# Patient Record
Sex: FEMALE | URBAN - METROPOLITAN AREA
[De-identification: names, ages, dates, MRNs, and addresses within clinical notes are randomized per-mention and may not be internally consistent; named-entity substitution may affect disease eponyms.]

---

## 2021-01-27 ENCOUNTER — APPOINTMENT (RX ONLY)
Dept: URBAN - METROPOLITAN AREA CLINIC 22 | Facility: CLINIC | Age: 41
Setting detail: DERMATOLOGY
End: 2021-01-27

## 2021-01-27 DIAGNOSIS — Z71.89 OTHER SPECIFIED COUNSELING: ICD-10-CM

## 2021-01-27 DIAGNOSIS — D22 MELANOCYTIC NEVI: ICD-10-CM

## 2021-01-27 DIAGNOSIS — D18.0 HEMANGIOMA: ICD-10-CM

## 2021-01-27 DIAGNOSIS — L81.4 OTHER MELANIN HYPERPIGMENTATION: ICD-10-CM

## 2021-01-27 DIAGNOSIS — L82.1 OTHER SEBORRHEIC KERATOSIS: ICD-10-CM

## 2021-01-27 PROBLEM — D18.01 HEMANGIOMA OF SKIN AND SUBCUTANEOUS TISSUE: Status: ACTIVE | Noted: 2021-01-27

## 2021-01-27 PROBLEM — D48.5 NEOPLASM OF UNCERTAIN BEHAVIOR OF SKIN: Status: ACTIVE | Noted: 2021-01-27

## 2021-01-27 PROBLEM — D22.5 MELANOCYTIC NEVI OF TRUNK: Status: ACTIVE | Noted: 2021-01-27

## 2021-01-27 PROCEDURE — ? BIOPSY BY SHAVE METHOD

## 2021-01-27 PROCEDURE — 11102 TANGNTL BX SKIN SINGLE LES: CPT

## 2021-01-27 PROCEDURE — ? COUNSELING

## 2021-01-27 PROCEDURE — ? ADDITIONAL NOTES

## 2021-01-27 PROCEDURE — 99203 OFFICE O/P NEW LOW 30 MIN: CPT | Mod: 25

## 2021-01-27 ASSESSMENT — LOCATION SIMPLE DESCRIPTION DERM
LOCATION SIMPLE: LEFT FOREARM
LOCATION SIMPLE: RIGHT UPPER BACK
LOCATION SIMPLE: LEFT UPPER BACK
LOCATION SIMPLE: ABDOMEN

## 2021-01-27 ASSESSMENT — LOCATION DETAILED DESCRIPTION DERM
LOCATION DETAILED: LEFT INFERIOR UPPER BACK
LOCATION DETAILED: LEFT LATERAL ABDOMEN
LOCATION DETAILED: LEFT SUPERIOR MEDIAL UPPER BACK
LOCATION DETAILED: LEFT PROXIMAL DORSAL FOREARM
LOCATION DETAILED: RIGHT MID-UPPER BACK

## 2021-01-27 ASSESSMENT — LOCATION ZONE DERM
LOCATION ZONE: ARM
LOCATION ZONE: TRUNK

## 2021-01-27 NOTE — PROCEDURE: ADDITIONAL NOTES
Render Risk Assessment In Note?: no
Additional Notes: Pt notes for last few months it has been consistently itching/irritating and additionally feels it has lightened considerably during this time.
Detail Level: Detailed

## 2021-01-27 NOTE — PROCEDURE: BIOPSY BY SHAVE METHOD

## 2024-04-12 ENCOUNTER — APPOINTMENT (OUTPATIENT)
Dept: RADIOLOGY | Facility: MEDICAL CENTER | Age: 44
End: 2024-04-12
Attending: EMERGENCY MEDICINE

## 2024-04-12 ENCOUNTER — HOSPITAL ENCOUNTER (EMERGENCY)
Facility: MEDICAL CENTER | Age: 44
End: 2024-04-12
Attending: EMERGENCY MEDICINE

## 2024-04-12 VITALS
OXYGEN SATURATION: 93 % | WEIGHT: 253.97 LBS | HEIGHT: 55 IN | RESPIRATION RATE: 16 BRPM | DIASTOLIC BLOOD PRESSURE: 84 MMHG | HEART RATE: 73 BPM | SYSTOLIC BLOOD PRESSURE: 139 MMHG | BODY MASS INDEX: 58.78 KG/M2 | TEMPERATURE: 97.7 F

## 2024-04-12 DIAGNOSIS — J18.9 PNEUMONIA DUE TO INFECTIOUS ORGANISM, UNSPECIFIED LATERALITY, UNSPECIFIED PART OF LUNG: ICD-10-CM

## 2024-04-12 PROCEDURE — 99283 EMERGENCY DEPT VISIT LOW MDM: CPT

## 2024-04-12 PROCEDURE — 700102 HCHG RX REV CODE 250 W/ 637 OVERRIDE(OP): Performed by: EMERGENCY MEDICINE

## 2024-04-12 PROCEDURE — 71045 X-RAY EXAM CHEST 1 VIEW: CPT

## 2024-04-12 PROCEDURE — A9270 NON-COVERED ITEM OR SERVICE: HCPCS | Performed by: EMERGENCY MEDICINE

## 2024-04-12 PROCEDURE — 700111 HCHG RX REV CODE 636 W/ 250 OVERRIDE (IP): Performed by: EMERGENCY MEDICINE

## 2024-04-12 RX ORDER — DOXYCYCLINE 100 MG/1
100 CAPSULE ORAL 2 TIMES DAILY
Qty: 14 CAPSULE | Refills: 0 | Status: SHIPPED | OUTPATIENT
Start: 2024-04-12 | End: 2024-04-12

## 2024-04-12 RX ORDER — DOXYCYCLINE 100 MG/1
100 CAPSULE ORAL 2 TIMES DAILY
Qty: 14 CAPSULE | Refills: 0 | Status: ACTIVE | OUTPATIENT
Start: 2024-04-12 | End: 2024-04-19

## 2024-04-12 RX ORDER — PREDNISONE 20 MG/1
60 TABLET ORAL DAILY
Status: DISCONTINUED | OUTPATIENT
Start: 2024-04-13 | End: 2024-04-12

## 2024-04-12 RX ORDER — PREDNISONE 20 MG/1
60 TABLET ORAL ONCE
Status: COMPLETED | OUTPATIENT
Start: 2024-04-12 | End: 2024-04-12

## 2024-04-12 RX ORDER — DOXYCYCLINE 100 MG/1
100 TABLET ORAL ONCE
Status: COMPLETED | OUTPATIENT
Start: 2024-04-12 | End: 2024-04-12

## 2024-04-12 RX ADMIN — DOXYCYCLINE 100 MG: 100 TABLET, FILM COATED ORAL at 20:29

## 2024-04-12 RX ADMIN — PREDNISONE 60 MG: 20 TABLET ORAL at 19:42

## 2024-04-12 ASSESSMENT — PAIN DESCRIPTION - PAIN TYPE: TYPE: ACUTE PAIN

## 2024-04-13 NOTE — ED NOTES
Pt discharged to home. Discharge paperwork provided. Education provided by ERP. Reinforced discharge instructions.  Pt was given follow up instructions and prescriptions.  Pt verbalized understanding of all instructions for discharge.   Patient went out of the ER ambulatory with steady gait., alert and oriented x 4, with all belongings.

## 2024-04-13 NOTE — ED PROVIDER NOTES
"ER Provider Note    Scribed for Dr. Seth Gonsalves M.D. by Cl Pace. 4/12/2024  7:27 PM    Primary Care Provider: No primary care provider on file.    CHIEF COMPLAINT  Chief Complaint   Patient presents with    Congestion    Cough       EXTERNAL RECORDS REVIEWED  No records available for review at this time.     HPI/ROS  LIMITATION TO HISTORY   Select: : None    OUTSIDE HISTORIAN(S):  None.     Hollie Russo is a 43 y.o. female who presents to the ED for evaluation of cough and congestion onset one week ago. Patient reports that her symptoms came on with chest pressure and slight coughing over the weekend. She notes that Five days ago her symptoms began to worsen and she began to experience shortness of breath and sputum production. She describes her sputum as clear, white, and occasionally yellow. Patient presented to Urgent Care three days ago where she had a negative viral workup, but was given a breathing treatment and prescribed albuterol. She has been using albuterol as prescribed for the past day, but feels as though she is not improving and is beginning to have symptoms in her sinuses, prompting her visit to the ED today.     PAST MEDICAL HISTORY  History reviewed. No pertinent past medical history.    SURGICAL HISTORY  History reviewed. No pertinent surgical history.    FAMILY HISTORY  History reviewed. No pertinent family history.    SOCIAL HISTORY   reports that she has never smoked. She has never used smokeless tobacco. She reports that she does not drink alcohol and does not use drugs.    CURRENT MEDICATIONS  Previous Medications    ALBUTEROL INH    Inhale.    ESCITALOPRAM OXALATE (LEXAPRO PO)    Take 20 mg by mouth every day.       ALLERGIES  Other drug and Shellfish allergy    PHYSICAL EXAM  BP (!) 174/107   Pulse 71   Temp 36.4 °C (97.6 °F) (Temporal)   Resp 18   Ht 1.372 m (4' 6\")   Wt 115 kg (253 lb 15.5 oz)   SpO2 94%   BMI 61.23 kg/m²   Constitutional: Alert in no apparent " distress.  HENT: No signs of trauma, Bilateral external ears normal, Nose normal.   Eyes: Pupils are equal and reactive, Conjunctiva normal, Non-icteric.   Neck: Normal range of motion, No tenderness, Supple,   Cardiovascular: Regular rate and rhythm, no murmurs.   Thorax & Lungs: Normal breath sounds, No respiratory distress, No wheezing, No chest tenderness.   Abdomen: Bowel sounds normal, Soft, No tenderness,   Skin: Warm, Dry, No erythema, No rash.   Back: No bony tenderness, No CVA tenderness.   Extremities: No edema, No tenderness, No cyanosis, no tenderness.  Neurologic: Alert, Grossly non-focal.   Psychiatric: Affect normal       DIAGNOSTIC STUDIES & PROCEDURES    Radiology:   The attending Emergency Physician has independently interpreted the diagnostic imaging associated with this visit and is awaiting the final reading from the radiologist, which will be displayed below.    Preliminary interpretation is a follows: Right upper lobe hazy opacity   Radiologist interpretation:     DX-CHEST-PORTABLE (1 VIEW)   Final Result      Mild right upper lobe pulmonary infiltrate/pneumonia.           COURSE & MEDICAL DECISION MAKING    ED Observation Status? No; Patient does not meet criteria for ED Observation.     INITIAL ASSESSMENT AND PLAN  Care Narrative:       7:27 PM - Patient seen and evaluated at bedside. Patient presents today with ongoing congestion and chest pressure. She was initially seen at urgent care but is seeking further evaluation due to ongoing symptoms. Discussed plan of care, including imaging her chest to rule out any possible pneumonia. Patient agrees to plan of care. Ordered DX-Chest to evaluate.    8:27 PM - I reevaluated the patient at bedside. I discussed the patient's diagnostic study results which show right upper lobe pneumonia. I discussed plan for discharge and follow up as outlined below. The patient is stable for discharge at this time and will return for any new or worsening  symptoms. Patient verbalizes understanding and support with my plan for discharge.          ADDITIONAL PROBLEM LIST AND DISPOSITION  At this time the patient has some mild shortness of breath.  There is no hypoxia.  There is no fever.  Do not believe this is sepsis.  X-ray does show pneumonia.  Antibiotics given.  Will not continue on steroids.  Will discharge home with strict return precautions and follow-up.               DISPOSITION AND DISCUSSIONS  I have discussed management of the patient with the following physicians and KAR's: None.     Discussion of management with other [facility] or appropriate source(s): None     Escalation of care considered, and ultimately not performed: Laboratory analysis.    Barriers to care at this time, including but not limited to:  None known at this time.  .     Decision tools and prescription drugs considered including, but not limited to: Antibiotics for pneumonia. .     The patient will return for new or worsening symptoms and is stable at the time of discharge.    DISPOSITION:  Patient will be discharged home in stable condition.    FOLLOW UP:  [facility]  [address] [facility] [address]  [phone]  In 2 days        OUTPATIENT MEDICATIONS:  New Prescriptions    DOXYCYCLINE (MONODOX) 100 MG CAPSULE    Take 1 Capsule by mouth 2 times a day for 7 days.        FINAL IMPRESSION   1. Pneumonia due to infectious organism, unspecified laterality, unspecified part of lung         Cl LOCKHART (Veronica), am scribing for, and in the presence of, Seth Gonsalves M.D..    Electronically signed by: Cl Pace (Veronica), 4/12/2024    Seth LOCKHART M.D. personally performed the services described in this documentation, as scribed by Cl Pace in my presence, and it is both accurate and complete.    The note accurately reflects work and decisions made by me.  Seth Gonsalves M.D.  4/12/2024  8:49 PM

## 2024-04-13 NOTE — ED TRIAGE NOTES
Chief Complaint   Patient presents with    Congestion    Cough     Pt ambulated to triage with above complaints x6days. Pt said that she went to Urgent care in [place] and diagnosed with Bronchitis prescribed inhaler.   Pt also tested negative for flu/covid . She is using an inhaler but no relief.   Speaking full sentences .